# Patient Record
Sex: MALE | Race: WHITE | URBAN - NONMETROPOLITAN AREA
[De-identification: names, ages, dates, MRNs, and addresses within clinical notes are randomized per-mention and may not be internally consistent; named-entity substitution may affect disease eponyms.]

---

## 2020-10-30 ENCOUNTER — IMPORTED ENCOUNTER (OUTPATIENT)
Dept: URBAN - NONMETROPOLITAN AREA CLINIC 1 | Facility: CLINIC | Age: 35
End: 2020-10-30

## 2020-10-30 PROBLEM — H11.31: Noted: 2020-10-30

## 2020-10-30 PROCEDURE — 99203 OFFICE O/P NEW LOW 30 MIN: CPT

## 2020-10-30 NOTE — PATIENT DISCUSSION
Subconjuctival Hemorrhage OD secondary to Trauma- Discussed diagnosis in detail with patient [facility] noted today with edema conjucntival laceration - Removed a baried eye lash from conjunctiva today at slitlamp with out complications with verbal consent from patient - Start Besivance QID OD samples given today- Start Prolensa BID OD sample given today- Start E-mycin ointment OD at bedtime RX sent to [id number] a droip of Cyclo in OD today - Continue to monitor  - RTC 1 week F/U may do Complete if OD is better

## 2021-03-08 ENCOUNTER — IMPORTED ENCOUNTER (OUTPATIENT)
Dept: URBAN - NONMETROPOLITAN AREA CLINIC 1 | Facility: CLINIC | Age: 36
End: 2021-03-08

## 2021-03-08 PROBLEM — H52.223: Noted: 2021-03-08

## 2021-03-08 PROCEDURE — S0620 ROUTINE OPHTHALMOLOGICAL EXA: HCPCS

## 2021-03-08 NOTE — PATIENT DISCUSSION
Astigmatism OUDiscussed refractive status in detail with patient. New glasses Rx given today. Patient ok to see [facility] for CL fitting. Continue to monitor.

## 2022-04-09 ASSESSMENT — VISUAL ACUITY
OS_CC: 20/40
OD_CC: 20/40
OS_PH: 20/29+
OS_CC: 20/50+
OD_CC: 20/20-1
OU_SC: 20/25

## 2022-04-09 ASSESSMENT — TONOMETRY
OD_IOP_MMHG: 11
OS_IOP_MMHG: 11
OD_IOP_MMHG: 14
OS_IOP_MMHG: 14